# Patient Record
Sex: MALE | URBAN - METROPOLITAN AREA
[De-identification: names, ages, dates, MRNs, and addresses within clinical notes are randomized per-mention and may not be internally consistent; named-entity substitution may affect disease eponyms.]

---

## 2022-06-06 ENCOUNTER — OFFICE VISIT (OUTPATIENT)
Dept: URBAN - METROPOLITAN AREA CLINIC 48 | Facility: CLINIC | Age: 77
End: 2022-06-06
Payer: COMMERCIAL

## 2022-06-06 DIAGNOSIS — H25.813 COMBINED FORMS OF AGE-RELATED CATARACT, BILATERAL: Primary | ICD-10-CM

## 2022-06-06 DIAGNOSIS — H25.812 COMBINED FORMS OF AGE-RELATED CATARACT, LEFT EYE: ICD-10-CM

## 2022-06-06 PROCEDURE — 99204 OFFICE O/P NEW MOD 45 MIN: CPT | Performed by: OPHTHALMOLOGY

## 2022-06-06 ASSESSMENT — VISUAL ACUITY
OS: 20/40
OD: 20/60

## 2022-06-06 ASSESSMENT — INTRAOCULAR PRESSURE
OS: 19
OD: 18

## 2022-06-06 ASSESSMENT — KERATOMETRY
OD: 42.88
OS: 42.75

## 2022-06-06 NOTE — IMPRESSION/PLAN
Impression: Combined forms of age-related cataract, bilateral: H25.813. Plan: The patient has a visually significant cataract in both eyes, after discussion with the patient and careful examination it has been determined that a cataract in both eyes is accounting for a significant amount of the patient's visual symptoms. Cataract surgery and the associated risks, benefits, alternatives, expectations, and recovery were discussed in detail with the patient. All questions were answered. The patient understands that there may be some limitation in visual potential given any pre-existing ocular disease. The patient desires cataract surgery in both eyes. Patient desires standard lens for distance target. Discussed post op medications: ERX vs Dexycu injection. Patient elects to use (ERX standard drops). All potential side effects and benefits of medication discussed . Patient understands Perla Redmond may be present 4-6wk post surgery, sometimes longer but will be absorbed by body. Schedule cataract surgery in both eyes; right eye, then left eye. RL 2 Please schedule Ascan then CE/IOL